# Patient Record
Sex: FEMALE | Race: WHITE | URBAN - METROPOLITAN AREA
[De-identification: names, ages, dates, MRNs, and addresses within clinical notes are randomized per-mention and may not be internally consistent; named-entity substitution may affect disease eponyms.]

---

## 2022-06-06 ENCOUNTER — OFFICE VISIT (OUTPATIENT)
Dept: URBAN - METROPOLITAN AREA CLINIC 13 | Facility: CLINIC | Age: 67
End: 2022-06-06
Payer: MEDICARE

## 2022-06-06 DIAGNOSIS — H25.13 AGE-RELATED NUCLEAR CATARACT, BILATERAL: ICD-10-CM

## 2022-06-06 DIAGNOSIS — H35.341 MACULAR CYST, HOLE, OR PSEUDOHOLE, RIGHT EYE: Primary | ICD-10-CM

## 2022-06-06 PROCEDURE — 92134 CPTRZ OPH DX IMG PST SGM RTA: CPT | Performed by: OPHTHALMOLOGY

## 2022-06-06 PROCEDURE — 99204 OFFICE O/P NEW MOD 45 MIN: CPT | Performed by: OPHTHALMOLOGY

## 2022-06-06 ASSESSMENT — INTRAOCULAR PRESSURE
OD: 17
OS: 14

## 2022-06-06 NOTE — IMPRESSION/PLAN
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 OCT OU = [facility]TM[facility] OD no SRF/IRF OS Plan: There is a symptomatic, full-thickness macular hole (MH). We discussed the natural history, and the RBACs of observation versus vitrectomy were explained. Macular holes can often, but not always, close with vitrectomy. If the hole is successfully closed, the vision usually improves; however the vision does not typically return to normal. The risk of surgery include, but are not limited to, infection, retinal tear and detachment, glaucoma, cataract formation in a phakic eye, hemorrhage, recurrent macular hole, CME, need for further surgery, loss of eye, and blindness. The patient understands that they cannot fly or travel to high altitudes until the gas bubble dissipates, otherwise they risk increased intraocular pressure, pain, and even blindness. The patient understands that positioning will be critical for success. Patient agrees to proceed. 

PPV/ILM peel/gas x [facility]TMH OD

## 2022-06-06 NOTE — IMPRESSION/PLAN
Impression: Age-related nuclear cataract, bilateral: H25.13. Plan: OD: Patient aware that vitrectomy will cause increased cataract growth and likely need for CE/IOL. 
OS: NVS

## 2022-06-16 ENCOUNTER — POST-OPERATIVE VISIT (OUTPATIENT)
Dept: URBAN - METROPOLITAN AREA CLINIC 54 | Facility: CLINIC | Age: 67
End: 2022-06-16
Payer: MEDICARE

## 2022-06-16 DIAGNOSIS — H35.341 MACULAR CYST, HOLE, OR PSEUDOHOLE, RIGHT EYE: Primary | ICD-10-CM

## 2022-06-16 PROCEDURE — 99024 POSTOP FOLLOW-UP VISIT: CPT | Performed by: OPHTHALMOLOGY

## 2022-06-16 ASSESSMENT — INTRAOCULAR PRESSURE
OD: 10
OS: 13

## 2022-06-16 NOTE — IMPRESSION/PLAN
Impression: S/P PPV/ILM peel/gas x [facility]TMH OD OD - 1 Day. Macular cyst, hole, or pseudohole, right eye  H35.341. Plan: PF/Oflox QID OD. Gas precautions. Avoid sleeping on back.  

RTC 1 wee POS DFE OD

## 2022-06-21 ENCOUNTER — POST-OPERATIVE VISIT (OUTPATIENT)
Dept: URBAN - METROPOLITAN AREA CLINIC 13 | Facility: CLINIC | Age: 67
End: 2022-06-21

## 2022-06-21 DIAGNOSIS — H35.341 MACULAR CYST, HOLE, OR PSEUDOHOLE, RIGHT EYE: Primary | ICD-10-CM

## 2022-06-21 PROCEDURE — 99024 POSTOP FOLLOW-UP VISIT: CPT | Performed by: OPHTHALMOLOGY

## 2022-06-21 ASSESSMENT — INTRAOCULAR PRESSURE
OS: 16
OD: 15

## 2022-06-21 NOTE — IMPRESSION/PLAN
Impression: S/P PPV/ILM peel/gas x [facility]TMH OD OD - 6 Days. Macular cyst, hole, or pseudohole, right eye  H35.341. Plan: No s/s of RD/infection VA/IOP acceptable Post-operative instructions and precautions Reviewed. Gas pos/prec. Call ASAP with changes --Taper Prednisolone acetate 1% TID x 1 wk, BID x 1wk, QD x 1wk, then d/c
--Discontinue Ocuflox Discussed with patient altitude precautions - wait until only 25% of gas is remaining prior to driving to [place]

1 month POS/OCT

## 2022-07-12 ENCOUNTER — POST-OPERATIVE VISIT (OUTPATIENT)
Dept: URBAN - METROPOLITAN AREA CLINIC 13 | Facility: CLINIC | Age: 67
End: 2022-07-12

## 2022-07-12 DIAGNOSIS — H35.341 MACULAR CYST, HOLE, OR PSEUDOHOLE, RIGHT EYE: Primary | ICD-10-CM

## 2022-07-12 PROCEDURE — 99024 POSTOP FOLLOW-UP VISIT: CPT | Performed by: OPHTHALMOLOGY

## 2022-07-12 ASSESSMENT — INTRAOCULAR PRESSURE
OS: 19
OD: 24

## 2022-07-12 NOTE — IMPRESSION/PLAN
Impression: S/P PPV/ILM/GAS OD - 27 Days. Macular cyst, hole, or pseudohole, right eye  H35.341. Plan: --Excellent post op course   
--Post operative instructions reviewed 
--Condition is improving RTC: 2-3 months DFE/OCT OU

## 2022-11-01 ENCOUNTER — OFFICE VISIT (OUTPATIENT)
Dept: URBAN - METROPOLITAN AREA CLINIC 13 | Facility: CLINIC | Age: 67
End: 2022-11-01
Payer: MEDICARE

## 2022-11-01 DIAGNOSIS — H25.13 AGE-RELATED NUCLEAR CATARACT, BILATERAL: ICD-10-CM

## 2022-11-01 DIAGNOSIS — H35.341 MACULAR CYST, HOLE, OR PSEUDOHOLE, RIGHT EYE: Primary | ICD-10-CM

## 2022-11-01 PROCEDURE — 99214 OFFICE O/P EST MOD 30 MIN: CPT | Performed by: OPHTHALMOLOGY

## 2022-11-01 PROCEDURE — 92134 CPTRZ OPH DX IMG PST SGM RTA: CPT | Performed by: OPHTHALMOLOGY

## 2022-11-01 ASSESSMENT — INTRAOCULAR PRESSURE
OD: 10
OS: 11

## 2022-11-01 NOTE — IMPRESSION/PLAN
Impression: Age-related nuclear cataract, bilateral: H25.13. Plan: OD:  No retinal contraindication for cataract surgery. D/w patient that retinal/macular pathology may limit final visual outcome. Patient voices understanding. OS: NVS. Obs.

## 2022-11-01 NOTE — IMPRESSION/PLAN
Impression: Macular cyst, hole, or pseudohole, right eye  H35.341. S/P PPV/ILM/GAS OD (06/15/2022) OCT OU - no IRF/SRF OU, [facility]TMH closed OD  / 240 Plan: MH closed Now with increased cataract - please see cataract diagnosis RDW Call ASAP with changes 12m OCT OU